# Patient Record
Sex: FEMALE | Race: WHITE | URBAN - METROPOLITAN AREA
[De-identification: names, ages, dates, MRNs, and addresses within clinical notes are randomized per-mention and may not be internally consistent; named-entity substitution may affect disease eponyms.]

---

## 2021-05-27 ENCOUNTER — HOSPITAL ENCOUNTER (OUTPATIENT)
Dept: RADIATION ONCOLOGY | Age: 71
Discharge: HOME | End: 2021-05-27
Payer: MEDICARE

## 2021-05-27 VITALS
OXYGEN SATURATION: 95 % | TEMPERATURE: 97.6 F | DIASTOLIC BLOOD PRESSURE: 48 MMHG | RESPIRATION RATE: 16 BRPM | HEART RATE: 65 BPM | SYSTOLIC BLOOD PRESSURE: 115 MMHG | WEIGHT: 167.2 LBS

## 2021-05-27 DIAGNOSIS — C34.11 MALIGNANT NEOPLASM OF UPPER LOBE OF RIGHT LUNG (HCC): Primary | ICD-10-CM

## 2021-05-27 PROCEDURE — 99205 OFFICE O/P NEW HI 60 MIN: CPT | Performed by: RADIOLOGY

## 2021-05-27 PROCEDURE — 99205 OFFICE O/P NEW HI 60 MIN: CPT

## 2021-05-27 SDOH — ECONOMIC STABILITY: HOUSING INSECURITY: PLEASE ASSESS YOUR PATIENT'S LEVEL OF DISTRESS CONCERNING HOUSING (SCALE FROM 1-10): 8

## 2021-05-27 ASSESSMENT — PAIN SCALES - GENERAL: PAINLEVEL_OUTOF10: 8

## 2021-05-27 ASSESSMENT — PAIN DESCRIPTION - ORIENTATION: ORIENTATION: RIGHT;LEFT

## 2021-05-27 ASSESSMENT — PAIN DESCRIPTION - LOCATION: LOCATION: HAND;FOOT;BACK

## 2021-05-27 ASSESSMENT — PAIN DESCRIPTION - PAIN TYPE: TYPE: CHRONIC PAIN

## 2021-05-27 NOTE — PROGRESS NOTES
Radiation Oncology      [facility] MD [address] [place] [address]      Referring Physician: Dr. Teressa Hay MD   Primary Oncologist: Dr. Desirae Rojas      Diagnosis: cT1 cNo cMo metachronous right lung cancer in the setting of previous RLL lung cancer (resected). Service:  Radiation Oncology consultation performed on 5/27/21        HPI:        Alissa Shanks is a pleasant 78year old with locally advanced lung cancer. Pt had a RL Lobectomy & lymphadenopathy with Dr. Preethi Patel at [facility] on 8/18/20. 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 Pt follows with Dr. Montez John & completed 4 cycles of chemotherapy on 12/11/20. Pt went for follow up scans per protocol. CT on 4/28/21 at [facility] revealed a RUL nodule, 8 mm, new since previous exam. Pt then had a pet at [facility] on 5/14/21 that revealed a RLL(RML/RUL? ) nodule with SUV of 4.6. Mets vs new primary. Pt is following with Dr. Montez John who has referred to radiation oncology for potential SBRT. The patient presents today to discuss fractionated external beam radiation therapy as a component of multidisciplinary, definative management. We reviewed the available medical records including the complete medical history of this pt today prior to consultation. Epic -CE and available scanned documents per the Epic Media tab were reviewed PRN. A complete ROS was also performed today and is noted below. During consultation today I personally discussed the pts workup to date; including but not limited to applicable imaging studies, Pathology reports, and interventions. The NCCN guidelines, as pertaining to the above diagnosis were also recapped for the pt today in brief. Today, Lissa Hernandez  notes Sx that include SOB. KPS 70.      -----        Per [address]:      [facility] records reviewed.         -----        Past Medical History:   Diagnosis Date  Acid reflux disease     Cancer (HCC)     lung     COPD (chronic obstructive pulmonary disease) (HCC)     Depression     Fibromyalgia     Gastroparesis     Hiatal hernia     Hypertension     Idiopathic pulmonary fibrosis (HCC)     Irritable bowel syndrome     OA (osteoarthritis)     Osteopenia     Pernicious anemia     RA (rheumatoid arthritis) ([facility] [address]        Past Surgical History:   Procedure Laterality Date    BACK SURGERY      kyphoplasty T10     BREAST BIOPSY      Left     SECTION      COLONOSCOPY      ENDOSCOPY, COLON, DIAGNOSTIC      FRACTURE SURGERY      left wrist     LUMBAR DISC SURGERY  2005    NERVE BLOCK  12    lumbar epidural  #1    NERVE BLOCK  12    lumbar epidural #2    NERVE BLOCK  12    lumbar epidural #3    ROTATOR CUFF REPAIR  2004    TUBAL LIGATION      UPPER GASTROINTESTINAL ENDOSCOPY  2007       Family History   Problem Relation Age of Onset    Heart Disease Other     High Blood Pressure Other     High Cholesterol Other     Diabetes Other     Cancer Maternal Grandmother     Cancer Maternal Grandfather        Current Outpatient Medications   Medication Sig Dispense Refill    traMADol (ULTRAM) 50 MG tablet Take 1 tablet by mouth every 12 hours as needed for Pain for 30 days. DO NOT FILL UNTIL 3-7-13 60 tablet 0    predniSONE (DELTASONE) 1 MG tablet Take 5 mg by mouth daily.  traMADol (ULTRAM-ER) 100 MG TB24 Take 100 mg by mouth every 8 hours as needed for Pain for 90 days. 90 tablet 2    folic acid (FOLVITE) 1 MG tablet Take 1 mg by mouth daily.  METHOTREXATE SODIUM IJ Inject 0.8 mg as directed once a week.  sulfaSALAzine (AZULFIDINE) 500 MG tablet Take 500 mg by mouth 2 times daily. Take 3 tabs      hydroxychloroquine (PLAQUENIL) 200 MG tablet Take  by mouth daily.  adalimumab (HUMIRA) 40 MG/0.8ML injection Inject 40 mg into the skin See Admin Instructions.  2 x per month      amitriptyline Emotionally Abused:     Physically Abused:     Sexually Abused:            Review of Systems - History obtained from chart review and the patient  General ROS: positive for  - fatigue  Psychological ROS: negative  Ophthalmic ROS: negative  ENT ROS: negative  Allergy and Immunology ROS: negative  Hematological and Lymphatic ROS: negative  Endocrine ROS: negative  Breast ROS: negative for breast lumps  Respiratory ROS: positive for - cough and shortness of breath  Cardiovascular ROS: no chest pain or dyspnea on exertion  Gastrointestinal ROS: no abdominal pain, change in bowel habits, or black or bloody stools  Genito-Urinary ROS: no dysuria, trouble voiding, or hematuria  Musculoskeletal ROS: negative  Neurological ROS: no TIA or stroke symptoms  Dermatological ROS: negative        Physical Exam  HENT:      Head: Normocephalic. Right Ear: External ear normal.      Left Ear: External ear normal.      Nose: Nose normal.      Mouth/Throat:      Mouth: Mucous membranes are moist.   Eyes:      Pupils: Pupils are equal, round, and reactive to light. Cardiovascular:      Rate and Rhythm: Normal rate. Pulses: Normal pulses. Pulmonary:      Effort: Pulmonary effort is normal.   Abdominal:      General: Abdomen is flat. Musculoskeletal:         General: Normal range of motion. Cervical back: Normal range of motion. Skin:     General: Skin is warm. Neurological:      General: No focal deficit present. Mental Status: She is oriented to person, place, and time. Psychiatric:         Mood and Affect: Mood normal.         Thought Content: Thought content normal.         Judgment: Judgment normal.             Imaging reviewed:      OSH imaging reviewed.       Radiation Safety and Treatment Support:  -previous Radiation history: No  -history of connective tissue disease: No  -history of autoimmune disease: No  -pregnant: not applicable  -fertility conservation and /or contraception discussed: not applicable  -nutrition consult prior to [place]: Yes  -PEG: No  -Dental evaluation prior to treatment:No  -Social Work requested: Yes  -Oncology Nurse Navigator requested: Yes  -pre + post treatment PT / Rehab / PM+R evaluation considered: Yes  -ICD: No   -ICD brand: -  -[facility] patient navigator: Miah Polk  -Nurse Practitioners for Radiation Oncology:    ---Miah Keen, MSN, RN, FNP-C   ---Luisa Parra, MSN, RN, FNP-BC        Assessment and Plan: Tyler Guido is a pleasant and cooperative 78year old with a recent diagnosis of AJCC stage group I (second primary) right lung cancer - medically unresectable. We recommend definitive intent fractionated external beam radiation therapy with a 3-5 fraction SBRT approach [NCCN NSCLC 4.2019 NSCLC-C 7/10, 3/10 ]. The risks, benefits, alternatives, process and logistics of stereotactic body radiation therapy (SBRT / SABR) were reviewed and specifically discussed in great detail - (risks discussed today include but are not limited to radiation pneumonitis, worsening PULM function, hemoptysis, lack of response, rib fracture, chest wall pain, oxygen dependence, esophagitis, esophageal structure, perforation, radiation induced neuropathies, vascular injury, bleeding and death, paralysis, second malignancy). SBRT is typically considered safe in terms of acute and chronic pulmonary toxicity, even for patients with severe PULM comobidities [Therese et al. Tere Ortiz. 2012 Mar; 7(3): 311-995 / Int J Radiat Oncol Biol Phys. 2018 Feb 1;100(2):462-469 ]. A second opinion was offered to this pt and was declined today at consult. Guidelines applicable to this pt reviewed and applied to ROSS and medical decision making as available and applicable [Pract Radiat Oncol. 2017 Sep - Oct;7(5):295-301]. We answered all of the patient's questions to the best of our ability. The patient verbalized understanding and seemed satisfied, desiring definitive intent SBRT.   Radiation planning will commence within 7-14 days; the next step in management being the simulation scan, with external beam radiation to commence in a timely fashion thereafter. It was a pleasure meeting Brian today and we appreciate the referral and opportunity to be involved in her care. We had an extensive discussion today regarding the course to date (including a focused review of theapplicable radiographic and laboratory information), multidisciplinary approach to cancer care, and indications for external beam radiation therapy as a component therein. A literature review and multidisciplinary discussion was performed after seeing this patient due to the complexity of the medical decision making in this case. I personally spent greater than 90 minutes on this case and with this patient. I performed the complete history and physical as above at today's visit, at least 45 minutes was in direct discussion and  regarding disease management.          -sim for SBRT  -obtain PFTs and scan in        [address] [place] [address]. Louann Ji MD [place] [address] Oncology  Cell: [phone]    [facility]:  [facility]: [phone]  [address] [place] [address] Street:  [phone]   FAX:    [phone]  [address] [place] [address] Road:  [phone]   FAX:  [phone]        NOTE: This report was transcribed using voice recognition software. Every effort was made to ensure accuracy; however, inadvertent computerized transcription errors may be present.

## 2021-05-27 NOTE — PATIENT INSTRUCTIONS
SIM SBRT      Chato Elliott. Julian Cespedes MD [facility]  [facility]  Cell: [phone]    [facility]:  [phone]   FAX: [phone]  [facility]:  [address] [place] [address] Avenue:    [phone]  [facility]:  [phone]   FAX:  [phone]  Email: [email or web address]

## 2021-05-27 NOTE — PROGRESS NOTES
Radha Martin  1950 78 y.o. Referring Physician: [facility]     PCP: Melba Beck MD     Vitals:    21 1319   BP: (!) 115/48   Pulse: 65   Resp: 16   Temp: 97.6 °F (36.4 °C)   SpO2: 95%        Wt Readings from Last 3 Encounters:   21 167 lb 3.2 oz (75.8 kg)        There is no height or weight on file to calculate BMI. Chief Complaint: No chief complaint on file. Cancer Staging  No matching staging information was found for the patient. Prior Radiation Therapy? NO    Concurrent Chemo/radiation? NO    Prior Chemotherapy? YES: Site Treated: R Lung           Facility: [facility]          Date: 10/9/20-20     Prior Hormonal Therapy? Oral contraceptives many years ago     Head and Neck Cancer? No, patient does NOT have HN cancer. LMP: \"long time ago\" 44's     Age at first Menses: 14    : 1    Para: 3    First gave birth at 34years old         Current Outpatient Medications   Medication Sig Dispense Refill    traMADol (ULTRAM) 50 MG tablet Take 1 tablet by mouth every 12 hours as needed for Pain for 30 days. DO NOT FILL UNTIL 3-7-13 60 tablet 0    predniSONE (DELTASONE) 1 MG tablet Take 5 mg by mouth daily.  traMADol (ULTRAM-ER) 100 MG TB24 Take 100 mg by mouth every 8 hours as needed for Pain for 90 days. 90 tablet 2    folic acid (FOLVITE) 1 MG tablet Take 1 mg by mouth daily.  METHOTREXATE SODIUM IJ Inject 0.8 mg as directed once a week.  sulfaSALAzine (AZULFIDINE) 500 MG tablet Take 500 mg by mouth 2 times daily. Take 3 tabs      hydroxychloroquine (PLAQUENIL) 200 MG tablet Take  by mouth daily.  adalimumab (HUMIRA) 40 MG/0.8ML injection Inject 40 mg into the skin See Admin Instructions. 2 x per month      amitriptyline (ELAVIL) 50 MG tablet Take 50 mg by mouth nightly.  sertraline (ZOLOFT) 50 MG tablet Take 50 mg by mouth daily.  CYANOCOBALAMIN IJ Inject 2 mLs as directed once a week.         CALCIUM-VITAMIN D PO Take  by mouth.  aspirin 81 MG chewable tablet Take 81 mg by mouth daily.  Bisacodyl (DULCOLAX PO) Take 2 tablets by mouth daily.  Doxylamine Succinate, Sleep, (UNISOM PO) Take 1 tablet by mouth daily. No current facility-administered medications for this encounter.        Past Medical History:   Diagnosis Date    Acid reflux disease     Cancer ([facility] [address]     lung     COPD [facility]chronic obstructive pulmonary disease) ([facility])     Depression     Fibromyalgia     Gastroparesis     Hiatal hernia     Hypertension     Idiopathic pulmonary fibrosis (HCC)     Irritable bowel syndrome     OA (osteoarthritis)     Osteopenia     Pernicious anemia     RA (rheumatoid arthritis) ([facility] [address]        Past Surgical History:   Procedure Laterality Date    BACK SURGERY      kyphoplasty T10     BREAST BIOPSY      Left     SECTION      COLONOSCOPY      ENDOSCOPY, COLON, DIAGNOSTIC      FRACTURE SURGERY      left wrist     LUMBAR DISC SURGERY      NERVE BLOCK  12    lumbar epidural  #1    NERVE BLOCK  12    lumbar epidural #2    NERVE BLOCK  12    lumbar epidural #3    ROTATOR CUFF REPAIR      TUBAL LIGATION      UPPER GASTROINTESTINAL ENDOSCOPY  2007       Family History   Problem Relation Age of Onset    Heart Disease Other     High Blood Pressure Other     High Cholesterol Other     Diabetes Other     Cancer Maternal Grandmother     Cancer Maternal Grandfather        Social History     Socioeconomic History    Marital status:      Spouse name: Rosemary Naylor Number of children: 1    Years of education: high school diploma     Highest education level: Not on file   Occupational History    Not on file   Tobacco Use    Smoking status: Former Smoker     Packs/day: 1.00     Years: 50.00     Pack years: 50.00     Quit date:      Years since quittin.4    Smokeless tobacco: Never Used   Substance and Sexual with her . Pt is in a wheel chair that she owns & uses for any distances. She does get around the house without wc. Her  states that she has frequent falls at home without injury. She requires assistance with ADL's, however her  is supportive & both parties declined PT/OT refereral. Pt is alert & oriented x 4 at this time.  states that she is forgetful frequently. Pt does have history of rheumatoid arthritis & is on methotrexate injections weekly. Pt follows with Dr. Wander Dawson for RA. Pt &  notified that pt will need to hold methotrexate 1 week prior to RT & until 1 week after RT is complete. This nurse will contact Dr. Wander Dawson for further instruction. Pt &  educated on RT, how it works, what to expect, side effect & management. Provided them with Radiation Therapy & You book & power point. Thoroughly discussed fall prevention/safety. Handout provided. All questions answered from a nursing perspective. Pt &  voiced understanding. Dr. Paul Auguste updated. Sim scheduled for 6/2/21 @ 11 am. Directions provided. Pacemaker/Defibulator/ICD:  No    Mediport: Yes        FALLS RISK SCREENING ASSESSMENT    Instructions:  Assess the patient and enter the appropriate indicators that are present for fall risk identification. Total the numbers entered and assign a fall risk score from Table 2.  Reassess patient at a minimum every 12 weeks or with status change. Assessment   Date  5/27/2021     1. Mental Ability: confusion/cognitively impaired No - 0       2. Elimination Issues: incontinence, frequency Yes-1       3. Ambulatory: use of assistive devices (walker, cane, off-loading devices), attached to equipment (IV pole, oxygen) Yes - 2     4. Sensory Limitations: dizziness, vertigo, impaired vision Yes - 3       5. Age 72 years or greater - 1       10. Medication: diuretics, strong analgesics, hypnotics, sedatives, antihypertensive agents   Yes - 3   7.   Falls:  recent history of falls within the last 3 months (not to include slipping or tripping)   Yes - 7   TOTAL 17    If score of 4 or greater was education given? Yes       TABLE 2   Risk Score Risk Level Plan of Care   0-3 Little or  No Risk 1. Provide assistance as indicated for ambulation activities  2. Reorient confused/cognitively impaired patient  3. Call-light/bell within patient's reach  4. Chair/bed in low position, stretcher/bed with siderails up except when performing patient care activities  5. Educate patient/family/caregiver on falls prevention  6.  Reassess in 12 weeks or with any noted change in patient condition which places them at a risk for a fall   4-6 Moderate Risk 1. Provide assistance as indicated for ambulation activities  2. Reorient confused/cognitively impaired patient  3. Call-light/bell within patient's reach  4. Chair/bed in low position, stretcher/bed with siderails up except when performing patient care activities  5. Educate patient/family/caregiver on falls prevention  6. Falls risk precaution (Yellow sticker Level II) placed on patient chart   7 or   Higher High Risk 1. Place patient in easily observable treatment room  2. Patient attended at all times by family member or staff  3. Provide assistance as indicated for ambulation activities  4. Reorient confused/cognitively impaired patient  5. Call-light/bell within patient's reach  6. Chair/bed in low position, stretcher/bed with siderails up except when performing patient care activities  7. Educate patient/family/caregiver on falls prevention  8. Falls risk precaution (Yellow sticker Level III) placed on patient chart           MALNUTRITION RISK SCREENING ASSESSMENT    Instructions:  Assess the patient and enter the appropriate indicators that are present for nutrition risk identification. Total the numbers entered and assign a risk score. Follow the appropriate action for total score listed below.      Assessment Date  5/27/2021     1. Have you lost weight without trying? 0- No     2. Have you been eating poorly because of a decreased appetite? 0- No   3. Do you have a diagnosis of head and neck cancer? 0- No                                                                                    TOTAL 0          Score of 0-1: No action  Score 2 or greater:  · For Non-Diabetic Patient: Recommend adding Ensure Complete 2 x daily and provide patient with Ensure wellness bag with coupons  · For Diabetic Patient: Recommend adding Glucerna Shake 2 x daily and provide patient with Glucerna Wellness bag with coupons  · Route to the dietitian via [employer]    · Are you having difficulty performing daily routine tasks due to fatigue or weakness (ie: bathing/showering, dressing, housework, meal prep, work, , etc): Yes     · Do you have any arm flexibility/ROM restrictions, swelling or pain that limit activity: No     · Any changes in memory, attention/focus that impact daily activities: Yes     · Do you avoid participation in leisure/social activity due to weakness, fatigue or pain: Yes     ARE ANY OF THE ABOVE ARE ANSWERED YES: Yes - but NO OT referral request sent due to patient refusal.          PT ASSESSMENT FOR REFERRAL    · Have you had any recent falls in the past 2 months: Yes     · Do you have difficulty going up/down stairs: Yes     · Are you having difficulty walking: Yes     · Do you often hold onto furniture/environmental supports or feel off balance when you are walking: Yes     · Do you need to take rest breaks when you are walking: Yes     · Any pain on a scale of 1-10 that limits your mobility: Yes 6/10    ARE ANY OF THE ABOVE ARE ANSWERED YES: Yes - but NO PT referral request sent due to patient refusal.               Tonya Saavedra    - Is patient planned to receive Cisplatin? No. This patient is not planned to start Cisplatin.     - Is patient planned

## 2021-05-28 ENCOUNTER — TELEPHONE (OUTPATIENT)
Dept: RADIATION ONCOLOGY | Age: 71
End: 2021-05-28

## 2021-05-28 NOTE — TELEPHONE ENCOUNTER
Dr. Paula Reese office notified that pt was going to be starting radiation in next couple weeks & methotrexate is contraindicated. Methotrexate will need to be held for 1 week prior to RT & for 1 week after completion of treatment, for a total of 3-4 weeks. Office to check with Dr. Genaro Castro & call back with further orders.

## 2021-06-02 ENCOUNTER — TELEPHONE (OUTPATIENT)
Dept: RADIATION ONCOLOGY | Age: 71
End: 2021-06-02

## 2021-06-02 NOTE — TELEPHONE ENCOUNTER
Received return call from Dr. Jasiel Fernandez office. They approved for methotrexate to be held & stated that she is on other maintenance drugs, therefore they do not need to supplement methotrexate while it is on hold. Will notify [facility] team since pt will be treated there.

## 2021-06-04 ENCOUNTER — HOSPITAL ENCOUNTER (OUTPATIENT)
Dept: RADIATION ONCOLOGY | Age: 71
Discharge: HOME | End: 2021-06-04
Attending: RADIOLOGY
Payer: MEDICARE

## 2021-06-04 PROCEDURE — 77334 RADIATION TREATMENT AID(S): CPT | Performed by: RADIOLOGY

## 2021-06-04 PROCEDURE — 77263 THER RADIOLOGY TX PLNG CPLX: CPT | Performed by: RADIOLOGY

## 2021-06-24 ENCOUNTER — TELEPHONE (OUTPATIENT)
Dept: CASE MANAGEMENT | Age: 71
End: 2021-06-24

## 2021-06-24 ENCOUNTER — HOSPITAL ENCOUNTER (OUTPATIENT)
Dept: RADIATION ONCOLOGY | Age: 71
Discharge: HOME | End: 2021-06-24
Attending: RADIOLOGY
Payer: MEDICARE

## 2021-06-24 PROCEDURE — 77293 RESPIRATOR MOTION MGMT SIMUL: CPT | Performed by: RADIOLOGY

## 2021-06-24 PROCEDURE — 77300 RADIATION THERAPY DOSE PLAN: CPT | Performed by: RADIOLOGY

## 2021-06-24 PROCEDURE — 77338 DESIGN MLC DEVICE FOR IMRT: CPT | Performed by: RADIOLOGY

## 2021-06-24 PROCEDURE — 77301 RADIOTHERAPY DOSE PLAN IMRT: CPT | Performed by: RADIOLOGY

## 2021-06-24 PROCEDURE — 77470 SPECIAL RADIATION TREATMENT: CPT | Performed by: RADIOLOGY

## 2021-06-24 NOTE — TELEPHONE ENCOUNTER
Updated by Lyudmila Cr that patients plan is being finalized. She needs to stop her Methotrexate 1 week prior to starting radiation. Called [facility] and updated her on status of her radiation plan and need to stop her Methotrexate. She stated that after talking with the doctor she stopped taking her methotrexate 3 or 4 weeks ago. Updated her that they will be calling her to schedule the start of her radiation. She verbalized understanding. Updated Lyudmila Cr and RT RN that patient ok to start whenever plan is finalized due to not currently taking her Methotrexate.

## 2021-06-30 ENCOUNTER — HOSPITAL ENCOUNTER (OUTPATIENT)
Dept: RADIATION ONCOLOGY | Age: 71
Discharge: HOME | End: 2021-06-30
Payer: MEDICARE

## 2021-06-30 ENCOUNTER — HOSPITAL ENCOUNTER (OUTPATIENT)
Dept: RADIATION ONCOLOGY | Age: 71
Discharge: HOME | End: 2021-06-30
Attending: RADIOLOGY
Payer: MEDICARE

## 2021-06-30 VITALS
RESPIRATION RATE: 18 BRPM | HEART RATE: 74 BPM | OXYGEN SATURATION: 96 % | SYSTOLIC BLOOD PRESSURE: 140 MMHG | DIASTOLIC BLOOD PRESSURE: 70 MMHG | TEMPERATURE: 98.1 F | WEIGHT: 170.25 LBS

## 2021-06-30 DIAGNOSIS — C34.90 MALIGNANT NEOPLASM OF LUNG, UNSPECIFIED LATERALITY, UNSPECIFIED PART OF LUNG (HCC): Primary | ICD-10-CM

## 2021-06-30 PROCEDURE — 77373 STRTCTC BDY RAD THER TX DLVR: CPT | Performed by: RADIOLOGY

## 2021-06-30 PROCEDURE — 99999 PR OFFICE/OUTPT VISIT,PROCEDURE ONLY: CPT | Performed by: RADIOLOGY

## 2021-06-30 RX ORDER — ALBUTEROL SULFATE 0.63 MG/3ML
1 SOLUTION RESPIRATORY (INHALATION) EVERY 6 HOURS PRN
COMMUNITY

## 2021-06-30 NOTE — PROGRESS NOTES
DEPARTMENT OF RADIATION ONCOLOGY ON TREATMENT VISIT         6/30/2021      NAME:  Colt Le    YOB: 1950    Diagnosis: lung ca    SUBJECTIVE:   Colt Le has now received fractionated external beam radiation therapy - ongoing SBRT. Past medical, surgical, social and family histories reviewed and updated as indicated. Pain: right breast pain, 4/10    ALLERGIES:  Patient has no known allergies. Current Outpatient Medications   Medication Sig Dispense Refill    traMADol (ULTRAM) 50 MG tablet Take 1 tablet by mouth every 12 hours as needed for Pain for 30 days. DO NOT FILL UNTIL 3-7-13 60 tablet 0    predniSONE (DELTASONE) 1 MG tablet Take 5 mg by mouth daily.  traMADol (ULTRAM-ER) 100 MG TB24 Take 100 mg by mouth every 8 hours as needed for Pain for 90 days. 90 tablet 2    folic acid (FOLVITE) 1 MG tablet Take 1 mg by mouth daily.  METHOTREXATE SODIUM IJ Inject 0.8 mg as directed once a week.  sulfaSALAzine (AZULFIDINE) 500 MG tablet Take 500 mg by mouth 2 times daily. Take 3 tabs      hydroxychloroquine (PLAQUENIL) 200 MG tablet Take  by mouth daily.  adalimumab (HUMIRA) 40 MG/0.8ML injection Inject 40 mg into the skin See Admin Instructions. 2 x per month      amitriptyline (ELAVIL) 50 MG tablet Take 50 mg by mouth nightly.  sertraline (ZOLOFT) 50 MG tablet Take 50 mg by mouth daily.  CYANOCOBALAMIN IJ Inject 2 mLs as directed once a week.  CALCIUM-VITAMIN D PO Take  by mouth.  aspirin 81 MG chewable tablet Take 81 mg by mouth daily.  Bisacodyl (DULCOLAX PO) Take 2 tablets by mouth daily.  Doxylamine Succinate, Sleep, (UNISOM PO) Take 1 tablet by mouth daily. No current facility-administered medications for this encounter. OBJECTIVE:  Alert and fully ambulatory.  Pleasant and conversant.    -breast pain    Physical Examination: General appearance - alert, well appearing, and in no

## 2021-06-30 NOTE — ADDENDUM NOTE
Encounter addended by: Lainey Wilhelm MD on: 6/30/2021 8:57 AM   Actions taken: Clinical Note Signed

## 2021-06-30 NOTE — PROGRESS NOTES
Jeny Dumont  2021  Wt Readings from Last 3 Encounters:   21 170 lb 4 oz (77.2 kg)   21 167 lb 3.2 oz (75.8 kg)     There is no height or weight on file to calculate BMI. Treatment Area:RLL    Patient was seen today for weekly visit. Comfort Alteration  KPS:70%  Fatigue: None    Ventilation Alterations  Cough: Yes / sometimes when mouth is dry  Hemoptysis: No  Mucus Color: no  Dyspnea: Yes/  sometimes  O2 Sat: 96%    Nutritional Alteration  Anorexia: No  Nausea: No   Vomiting: No     Skin Alteration   Sensation:none    Radiation Dermatitis:  None, educated re: moisturizing skin at least daily, verbalizes understanding    Mucous Membrane Alteration  Voice Changes/ Stridor/Larynx: no  Pharynx & Esophagus: na    Elimination Alterations  Constipation: yes/ always has issues with it, takes miralax PRN  Diarrhea:  no      Emotional  Coping: effective      Injury, potential bleeding or infection: na    Other:na    No results found for: WBC, PLT      BP (!) 152/70   Pulse 74   Temp 98.1 °F (36.7 °C) (Temporal)   Resp 18   Wt 170 lb 4 oz (77.2 kg)   SpO2 96%   BP within normal range? no   -if no, manually recheck in 5-10 min  NEW BP readin/70  Denies headache  BP within normal range?  yes   -if no, notify attending provider for further instruction      Assessment/Plan:  Completed 1/5 fractions; 1000/5000 cGy    Rachel Panchal RN

## 2021-06-30 NOTE — PROGRESS NOTES
Radiation Oncology          Ms. Jeny Dumont underwent fractionated external beam radiation therapy using a SBRT / SABR technique. I was personally present for the treatment planning (+/- 4D CT, W/WO Ab compression) imaging and pt setup to ensure appropriate immobilization to meet current RADHA standards. After a detailed review of the treatment plan and appropriate physics QA / oversight this pt was scheduled and underwent hypo fractionated treatment as noted per the D/I in [facility]. Typical fractionation schemes include but are not limited to 5000 Gy in 3-5 fractions. The NCCN guidelines and pt workup including a detailed discussion of the risks, benefits and alternative were discussed previously [RTOG dose constraints met per plan as applicable- see Mosaiq]. The pt verbalized understanding for the risks of this procedure today prior to Tx. Today, after a dual identification time out (including a brief plan overview )- I personally reviewed the complex multifaceted immobilization apparatus W/WO compression +/- Synergy (PRN), patient position, and 4D imaging (if applicable) prior to the treatment delivery to ensure accuracy. The SBRT team, including myself, were all present for the set up CT scan and delivery of the fraction (the latter on a PRN basis). The patient successfully completed the procedure today in stable condition; this procedure was well tolerated today. Maria Isabel Ferraro has now received 5000 cGy in 1/5 fractions directed to the RUL lung nodule. Herson Claudio.  Anika Em MD [place] [address] Oncology  Cell: [phone]    [facility]:  [phone]   FAX: [phone]  [facility]:  [address] [place] [address] Avenue:    [phone]  [address] [place] [address] Road:  [address] [place] [address] Road:  [phone]

## 2021-07-01 ENCOUNTER — APPOINTMENT (OUTPATIENT)
Dept: RADIATION ONCOLOGY | Age: 71
End: 2021-07-01
Attending: RADIOLOGY
Payer: MEDICARE

## 2021-07-02 ENCOUNTER — HOSPITAL ENCOUNTER (OUTPATIENT)
Dept: RADIATION ONCOLOGY | Age: 71
Discharge: HOME | End: 2021-07-02
Attending: RADIOLOGY
Payer: MEDICARE

## 2021-07-02 ENCOUNTER — APPOINTMENT (OUTPATIENT)
Dept: RADIATION ONCOLOGY | Age: 71
End: 2021-07-02
Attending: RADIOLOGY
Payer: MEDICARE

## 2021-07-02 PROCEDURE — 99999 PR OFFICE/OUTPT VISIT,PROCEDURE ONLY: CPT | Performed by: RADIOLOGY

## 2021-07-02 PROCEDURE — 77373 STRTCTC BDY RAD THER TX DLVR: CPT | Performed by: RADIOLOGY

## 2021-07-06 ENCOUNTER — HOSPITAL ENCOUNTER (OUTPATIENT)
Dept: RADIATION ONCOLOGY | Age: 71
Discharge: HOME | End: 2021-07-06
Attending: RADIOLOGY
Payer: MEDICARE

## 2021-07-06 PROCEDURE — 77373 STRTCTC BDY RAD THER TX DLVR: CPT | Performed by: RADIOLOGY

## 2021-07-06 NOTE — PROGRESS NOTES
RADIATION ONCOLOGY PROCEDURE NOTE          Ms.Kathie IVETH Dumont underwent fractionated external beam radiation therapy using a SBRT / SABR technique. I was personally present for the treatment planning (+/- 4D CT, W/WO Ab compression) imaging and pt setup to ensure appropriate immobilization to meet current RADHA standards. After a detailed review of the treatment plan and appropriate physics QA / oversight this pt was scheduled and underwent hypo fractionated treatment as noted per the D/I in [facility]. Typical fractionation schemes include but are not limited to 5000 cGy in 3-5 fractions. The NCCN guidelines and pt workup including a detailed discussion of the risks, benefits and alternative were discussed previously [RTOG dose constraints met per plan as applicable- see Mosaiq]. The pt verbalized understanding for the risks of this procedure today prior to Tx. Today, after a dual identification time out (including a brief plan overview )- I personally reviewed the complex multifaceted immobilization apparatus W/WO compression +/- Synergy (PRN), patient position, and 4D imaging (if applicable) prior to the treatment delivery to ensure accuracy. The SBRT team, including myself, were all present for the set up CT scan and delivery of the fraction (the latter on a PRN basis). The patient successfully completed the procedure today in stable condition; this procedure was well-tolerated today. Forest Councilman has now received 3000 cGy in 3/5 fractions directed to the right lower lobe.       Robi Faria MD, [address], [facility], [address] [place] [address]    Department of Radiation Oncology  [place] [id number] [facility]: [phone] (MANNY: [phone])  [address] [facility]: [phone] (ZYQ: [phone])  [address] [facility]) [facility]:  [phone] (BLF:  [phone])

## 2021-07-07 ENCOUNTER — HOSPITAL ENCOUNTER (OUTPATIENT)
Dept: RADIATION ONCOLOGY | Age: 71
Discharge: HOME | End: 2021-07-07
Payer: MEDICARE

## 2021-07-07 ENCOUNTER — HOSPITAL ENCOUNTER (OUTPATIENT)
Dept: RADIATION ONCOLOGY | Age: 71
Discharge: HOME | End: 2021-07-07
Attending: RADIOLOGY
Payer: MEDICARE

## 2021-07-07 VITALS
OXYGEN SATURATION: 97 % | TEMPERATURE: 97.1 F | SYSTOLIC BLOOD PRESSURE: 138 MMHG | DIASTOLIC BLOOD PRESSURE: 64 MMHG | HEART RATE: 84 BPM | RESPIRATION RATE: 18 BRPM

## 2021-07-07 DIAGNOSIS — C34.90 MALIGNANT NEOPLASM OF LUNG, UNSPECIFIED LATERALITY, UNSPECIFIED PART OF LUNG (HCC): Primary | ICD-10-CM

## 2021-07-07 PROCEDURE — 77373 STRTCTC BDY RAD THER TX DLVR: CPT | Performed by: RADIOLOGY

## 2021-07-07 PROCEDURE — 99999 PR OFFICE/OUTPT VISIT,PROCEDURE ONLY: CPT | Performed by: RADIOLOGY

## 2021-07-07 ASSESSMENT — PAIN SCALES - GENERAL: PAINLEVEL_OUTOF10: 3

## 2021-07-07 NOTE — PROGRESS NOTES
Jeny LAZARO Tim  7/7/2021  Wt Readings from Last 3 Encounters:   06/30/21 170 lb 4 oz (77.2 kg)   05/27/21 167 lb 3.2 oz (75.8 kg)     There is no height or weight on file to calculate BMI. Treatment Area:RLL SBRT    Patient was seen today for weekly visit. Comfort Alteration  KPS:70%  Fatigue: Moderate    Ventilation Alterations  Cough: Yes  Hemoptysis: No  Mucus Color: clear  Dyspnea: No  O2 Sat: 97%    Nutritional Alteration  Anorexia: No  Nausea: No   Vomiting: No     Skin Alteration   Sensation:na    Radiation Dermatitis:  na    Mucous Membrane Alteration  Voice Changes/ Stridor/Larynx: no  Pharynx & Esophagus: na    Elimination Alterations  Constipation: yes  Diarrhea:  no      Emotional  Coping: effective      Injury, potential bleeding or infection: na    Other:na    No results found for: WBC, PLT      /64   Pulse 84   Temp 97.1 °F (36.2 °C) (Skin)   Resp 18   SpO2 97%   BP within normal range? yes        Assessment/Plan:4/5fx 4000/5000cGY completed, encouraged to use walker for c/o numerous falls.     Cindi De Santiago RN
Radiation Oncology          Ms. Jeny Dumont underwent fractionated external beam radiation therapy using a SBRT / SABR technique. I was personally present for the treatment planning (+/- 4D CT, W/WO Ab compression) imaging and pt setup to ensure appropriate immobilization to meet current RADHA standards. After a detailed review of the treatment plan and appropriate physics QA / oversight this pt was scheduled and underwent hypo fractionated treatment as noted per the D/I in [facility]. Typical fractionation schemes include but are not limited to 5000 Gy in 3-5 fractions. The NCCN guidelines and pt workup including a detailed discussion of the risks, benefits and alternative were discussed previously [RTOG dose constraints met per plan as applicable- see Mosaiq]. The pt verbalized understanding for the risks of this procedure today prior to Tx. Today, after a dual identification time out (including a brief plan overview )- I personally reviewed the complex multifaceted immobilization apparatus W/WO compression +/- Synergy (PRN), patient position, and 4D imaging (if applicable) prior to the treatment delivery to ensure accuracy. The SBRT team, including myself, were all present for the set up CT scan and delivery of the fraction (the latter on a PRN basis). The patient successfully completed the procedure today in stable condition; this procedure was well tolerated today. Kelvin Wooten has now received 4000 cGy in 4/5 fractions directed to the lung nodule. Valerie Matamoros.  Andrew Sousa MD [place] [address] Oncology  Cell: [phone]    [facility]:  [phone]   FAX: [phone]  [address] [place] [address]:  [address] [place] [address] Avenue:    [phone]  [facility]:  [address] [place] [address] Road:  [phone]
Pleasant and conversant. Physical Examination: General appearance - alert, well appearing, and in no distress. Wt Readings from Last 3 Encounters:   06/30/21 170 lb 4 oz (77.2 kg)   05/27/21 167 lb 3.2 oz (75.8 kg)         ASSESSMENT/PLAN:     Patient is tolerating treatments well with expected toxicities. RBA were reviewed prior to first fraction and PRN. Current and planned dose reviewed. Goals of treatment and potential side effects were reviewed with the patient PRN. Treatment imaging has been personally reviewed for accuracy and precision. Questions answered to apparent satisfaction. Treatments will continue as planned. Lexie Leone.  Katja Colindres MD MS DABR  Radiation Oncologist        PHYSICIANS [facility] ([address] [place] [address]): [phone] /// FAX: [phone]  [facility]): [phone] /// FAX: [phone]  [address] [place] [address]): [phone] /// FAX: [phone]

## 2021-07-07 NOTE — ADDENDUM NOTE
Encounter addended by: Cindi De Santiago RN on: 7/7/2021 2:02 PM   Actions taken: Care Plan modified, Flowsheet accepted, Order Reconciliation Section accessed, Medication List reviewed, Clinical Note Signed

## 2021-07-08 ENCOUNTER — TELEPHONE (OUTPATIENT)
Dept: RADIATION ONCOLOGY | Age: 71
End: 2021-07-08

## 2021-07-09 ENCOUNTER — APPOINTMENT (OUTPATIENT)
Dept: RADIATION ONCOLOGY | Age: 71
End: 2021-07-09
Attending: RADIOLOGY
Payer: MEDICARE

## 2021-07-09 ENCOUNTER — TELEPHONE (OUTPATIENT)
Dept: RADIATION ONCOLOGY | Age: 71
End: 2021-07-09

## 2021-07-09 NOTE — TELEPHONE ENCOUNTER
Patient's  called gave me update on her because of her surgery. Was instructed to call us as soon as she is recovered to finish radiation therapy.  He understood and will follow thru

## 2021-07-13 ENCOUNTER — APPOINTMENT (OUTPATIENT)
Dept: RADIATION ONCOLOGY | Age: 71
End: 2021-07-13
Attending: RADIOLOGY
Payer: MEDICARE

## 2021-07-16 ENCOUNTER — APPOINTMENT (OUTPATIENT)
Dept: RADIATION ONCOLOGY | Age: 71
End: 2021-07-16
Attending: RADIOLOGY
Payer: MEDICARE

## 2021-07-23 ENCOUNTER — APPOINTMENT (OUTPATIENT)
Dept: RADIATION ONCOLOGY | Age: 71
End: 2021-07-23
Attending: RADIOLOGY
Payer: MEDICARE

## 2021-07-23 ENCOUNTER — TELEPHONE (OUTPATIENT)
Dept: RADIATION ONCOLOGY | Age: 71
End: 2021-07-23

## 2021-08-27 ENCOUNTER — HOSPITAL ENCOUNTER (OUTPATIENT)
Dept: RADIATION ONCOLOGY | Age: 71
Discharge: HOME | End: 2021-08-27
Attending: RADIOLOGY
Payer: MEDICARE

## 2021-09-13 ENCOUNTER — HOSPITAL ENCOUNTER (OUTPATIENT)
Dept: RADIATION ONCOLOGY | Age: 71
Discharge: HOME | End: 2021-09-13
Attending: RADIOLOGY
Payer: MEDICARE

## 2021-09-13 DIAGNOSIS — C34.90 MALIGNANT NEOPLASM OF LUNG, UNSPECIFIED LATERALITY, UNSPECIFIED PART OF LUNG (HCC): Primary | ICD-10-CM

## 2021-09-13 PROCEDURE — 99999 PR OFFICE/OUTPT VISIT,PROCEDURE ONLY: CPT | Performed by: RADIOLOGY

## 2021-09-13 NOTE — PATIENT INSTRUCTIONS
AARON Da Silva. Daniel Sigala MD, MS [facility]:  [phone]   FAX: [phone]  [facility]:  [phone]   FAX:    [phone]  Jean Tuttle:  [phone]   FAX:  [phone]  Email: [email or web address]

## 2021-09-13 NOTE — PROGRESS NOTES
Radiation Oncology          This pt was prescribed a definative course of SBRT for medically unresectable NSCLC. Due to exacerbation of comorbid conditions (not related to RT) the pt did not finish her course. The prescribed single fractions will complete her definative course of SBRT and is medically indicated. Calli Chakraborty.  Sophia Baldwin MD [place] [address] Oncology  Cell: [phone]    [facility]:  [phone]   FAX: [phone] [address] [facility]:  [address] [place] [address] Avenue:    [phone]  [address] [place] [address] Road:  [address] [place] [address] Road:  [phone]

## 2021-09-16 ENCOUNTER — HOSPITAL ENCOUNTER (OUTPATIENT)
Dept: RADIATION ONCOLOGY | Age: 71
Discharge: HOME | End: 2021-09-16
Attending: RADIOLOGY
Payer: MEDICARE

## 2021-09-16 PROCEDURE — 77338 DESIGN MLC DEVICE FOR IMRT: CPT | Performed by: RADIOLOGY

## 2021-09-16 PROCEDURE — 77300 RADIATION THERAPY DOSE PLAN: CPT | Performed by: RADIOLOGY

## 2021-09-16 PROCEDURE — 77301 RADIOTHERAPY DOSE PLAN IMRT: CPT | Performed by: RADIOLOGY

## 2021-09-20 ENCOUNTER — HOSPITAL ENCOUNTER (OUTPATIENT)
Dept: RADIATION ONCOLOGY | Age: 71
Discharge: HOME | End: 2021-09-20
Attending: RADIOLOGY
Payer: MEDICARE

## 2021-09-20 VITALS
DIASTOLIC BLOOD PRESSURE: 84 MMHG | HEART RATE: 78 BPM | OXYGEN SATURATION: 96 % | RESPIRATION RATE: 18 BRPM | SYSTOLIC BLOOD PRESSURE: 128 MMHG | TEMPERATURE: 97.6 F | WEIGHT: 156 LBS

## 2021-09-20 DIAGNOSIS — C34.90 MALIGNANT NEOPLASM OF LUNG, UNSPECIFIED LATERALITY, UNSPECIFIED PART OF LUNG (HCC): Primary | ICD-10-CM

## 2021-09-20 PROCEDURE — 77336 RADIATION PHYSICS CONSULT: CPT | Performed by: RADIOLOGY

## 2021-09-20 PROCEDURE — 99999 PR OFFICE/OUTPT VISIT,PROCEDURE ONLY: CPT | Performed by: RADIOLOGY

## 2021-09-20 PROCEDURE — 77373 STRTCTC BDY RAD THER TX DLVR: CPT | Performed by: RADIOLOGY

## 2021-09-20 PROCEDURE — 77435 SBRT MANAGEMENT: CPT | Performed by: RADIOLOGY

## 2021-09-20 NOTE — PROGRESS NOTES
DEPARTMENT OF RADIATION ONCOLOGY ON TREATMENT VISIT         9/20/2021      NAME:  Mayra Scheuermann    YOB: 1950    Diagnosis: lung cancer    SUBJECTIVE:   Mayra Scheuermann has now received fractionated external beam radiation therapy - ongoing. Past medical, surgical, social and family histories reviewed and updated as indicated. Pain: controlled    ALLERGIES:  Patient has no known allergies. Current Outpatient Medications   Medication Sig Dispense Refill    albuterol (ACCUNEB) 0.63 MG/3ML nebulizer solution Take 1 ampule by nebulization every 6 hours as needed for Wheezing      traMADol (ULTRAM) 50 MG tablet Take 1 tablet by mouth every 12 hours as needed for Pain for 30 days. DO NOT FILL UNTIL 3-7-13 60 tablet 0    predniSONE (DELTASONE) 1 MG tablet Take 5 mg by mouth daily.  traMADol (ULTRAM-ER) 100 MG TB24 Take 100 mg by mouth every 8 hours as needed for Pain for 90 days. 90 tablet 2    folic acid (FOLVITE) 1 MG tablet Take 1 mg by mouth daily.  METHOTREXATE SODIUM IJ Inject 0.8 mg as directed once a week.  sulfaSALAzine (AZULFIDINE) 500 MG tablet Take 500 mg by mouth 2 times daily. Take 3 tabs      hydroxychloroquine (PLAQUENIL) 200 MG tablet Take  by mouth daily.  adalimumab (HUMIRA) 40 MG/0.8ML injection Inject 40 mg into the skin See Admin Instructions. 2 x per month      amitriptyline (ELAVIL) 50 MG tablet Take 50 mg by mouth nightly.  sertraline (ZOLOFT) 50 MG tablet Take 50 mg by mouth daily.  CYANOCOBALAMIN IJ Inject 2 mLs as directed once a week.  CALCIUM-VITAMIN D PO Take  by mouth.  aspirin 81 MG chewable tablet Take 81 mg by mouth daily.  Bisacodyl (DULCOLAX PO) Take 2 tablets by mouth daily.  Doxylamine Succinate, Sleep, (UNISOM PO) Take 1 tablet by mouth daily. No current facility-administered medications for this encounter. OBJECTIVE:  Alert and fully ambulatory.  Pleasant and conversant. Physical Examination: General appearance - alert, well appearing, and in no distress. Wt Readings from Last 3 Encounters:   06/30/21 170 lb 4 oz (77.2 kg)   05/27/21 167 lb 3.2 oz (75.8 kg)         ASSESSMENT/PLAN:     Patient is tolerating treatments well with expected toxicities. RBA were reviewed prior to first fraction and PRN. Current and planned dose reviewed. Goals of treatment and potential side effects were reviewed with the patient PRN. Treatment imaging has been personally reviewed for accuracy and precision. Questions answered to apparent satisfaction. Treatments will continue as planned. Miguel Malloy.  Queen Amado MD MS DABR  Radiation Oncologist        [address] ([facility]): [phone] /// FAX: [phone]  [facility]): [phone] /// FAX: [phone]  Damián Jaramillo): [phone] /// FAX: [phone]

## 2021-09-20 NOTE — PROGRESS NOTES
Jeny Dumont  9/20/2021  Wt Readings from Last 3 Encounters:   09/20/21 156 lb (70.8 kg)   06/30/21 170 lb 4 oz (77.2 kg)   05/27/21 167 lb 3.2 oz (75.8 kg)     There is no height or weight on file to calculate BMI. Treatment Area:SBRT ITV RLL    Patient was seen today for weekly visit. Comfort Alteration  KPS:70%  Fatigue: Mild    Ventilation Alterations  Cough: No  Hemoptysis: No  Mucus Color: na  Dyspnea: No  O2 Sat: 96%    Nutritional Alteration  Anorexia: No  Nausea: No   Vomiting: No     Skin Alteration   Sensation:no    Radiation Dermatitis:  no    Mucous Membrane Alteration  Voice Changes/ Stridor/Larynx: no  Pharynx & Esophagus: na    Elimination Alterations  Constipation: no  Diarrhea:  no      Emotional  Coping: somewhat effective      Injury, potential bleeding or infection: na    Other:no    No results found for: WBC, PLT      /84   Pulse 78   Temp 97.6 °F (36.4 °C) (Temporal)   Resp 18   Wt 156 lb (70.8 kg)   SpO2 96%   BP within normal range? yes       Assessment/Plan: Pt completed 1fx and 1600cGy. Pt tolerated tx well thus far.     Yoeslin Mcdermott RN

## 2021-09-20 NOTE — PROGRESS NOTES
DEPARTMENT OF RADIATION ONCOLOGY ON TREATMENT VISIT         9/20/2021      NAME:  Shweta Gibson    YOB: 1950    Diagnosis: lung cancer    SUBJECTIVE:   Shweta Gibson has now received fractionated external beam radiation therapy - ongoing. Past medical, surgical, social and family histories reviewed and updated as indicated. Pain: controlled    ALLERGIES:  Patient has no known allergies. Current Outpatient Medications   Medication Sig Dispense Refill    albuterol (ACCUNEB) 0.63 MG/3ML nebulizer solution Take 1 ampule by nebulization every 6 hours as needed for Wheezing      traMADol (ULTRAM) 50 MG tablet Take 1 tablet by mouth every 12 hours as needed for Pain for 30 days. DO NOT FILL UNTIL 3-7-13 60 tablet 0    predniSONE (DELTASONE) 1 MG tablet Take 5 mg by mouth daily.  traMADol (ULTRAM-ER) 100 MG TB24 Take 100 mg by mouth every 8 hours as needed for Pain for 90 days. 90 tablet 2    folic acid (FOLVITE) 1 MG tablet Take 1 mg by mouth daily.  METHOTREXATE SODIUM IJ Inject 0.8 mg as directed once a week.  sulfaSALAzine (AZULFIDINE) 500 MG tablet Take 500 mg by mouth 2 times daily. Take 3 tabs      hydroxychloroquine (PLAQUENIL) 200 MG tablet Take  by mouth daily.  adalimumab (HUMIRA) 40 MG/0.8ML injection Inject 40 mg into the skin See Admin Instructions. 2 x per month      amitriptyline (ELAVIL) 50 MG tablet Take 50 mg by mouth nightly.  sertraline (ZOLOFT) 50 MG tablet Take 50 mg by mouth daily.  CYANOCOBALAMIN IJ Inject 2 mLs as directed once a week.  CALCIUM-VITAMIN D PO Take  by mouth.  aspirin 81 MG chewable tablet Take 81 mg by mouth daily.  Bisacodyl (DULCOLAX PO) Take 2 tablets by mouth daily.  Doxylamine Succinate, Sleep, (UNISOM PO) Take 1 tablet by mouth daily. No current facility-administered medications for this encounter. OBJECTIVE:  Alert and fully ambulatory.  Pleasant and conversant. Physical Examination: General appearance - alert, well appearing, and in no distress. Wt Readings from Last 3 Encounters:   09/20/21 156 lb (70.8 kg)   06/30/21 170 lb 4 oz (77.2 kg)   05/27/21 167 lb 3.2 oz (75.8 kg)         ASSESSMENT/PLAN:     Patient is tolerating treatments well with expected toxicities. RBA were reviewed prior to first fraction and PRN. Current and planned dose reviewed. Goals of treatment and potential side effects were reviewed with the patient PRN. Treatment imaging has been personally reviewed for accuracy and precision. Questions answered to apparent satisfaction. Treatments will continue as planned. Maren Lemus.  Prasanna Coreas MD MS DABR  Radiation Oncologist        [facility] ([address] [place] [address]): [phone] /// FAX: [phone]  [facility]): [phone] /// FAX: [phone]  [facility]): [phone] /// FAX: [phone]

## 2021-09-20 NOTE — PATIENT INSTRUCTIONS
SBRT      Roscoe Oconnell. MD [address] Oncology  Cell: [phone]    [facility]:  [phone]   FAX: [phone] [address] [facility]:  [address] [place] [address] Avenue:    [phone]  [address] [place] [address] Road:  [phone]   FAX:  [phone]  Email: [email or web address]